# Patient Record
Sex: MALE | Race: WHITE | Employment: OTHER | URBAN - METROPOLITAN AREA
[De-identification: names, ages, dates, MRNs, and addresses within clinical notes are randomized per-mention and may not be internally consistent; named-entity substitution may affect disease eponyms.]

---

## 2018-07-06 ENCOUNTER — OFFICE VISIT (OUTPATIENT)
Dept: ENDOCRINOLOGY | Facility: CLINIC | Age: 28
End: 2018-07-06
Payer: COMMERCIAL

## 2018-07-06 VITALS
TEMPERATURE: 97 F | DIASTOLIC BLOOD PRESSURE: 66 MMHG | HEIGHT: 67 IN | BODY MASS INDEX: 28.46 KG/M2 | WEIGHT: 181.3 LBS | HEART RATE: 57 BPM | SYSTOLIC BLOOD PRESSURE: 117 MMHG

## 2018-07-06 DIAGNOSIS — R63.5 WEIGHT GAIN: Primary | ICD-10-CM

## 2018-07-06 LAB
ANION GAP SERPL CALCULATED.3IONS-SCNC: 6 MMOL/L (ref 3–14)
BUN SERPL-MCNC: 29 MG/DL (ref 7–30)
CALCIUM SERPL-MCNC: 9 MG/DL (ref 8.5–10.1)
CHLORIDE SERPL-SCNC: 106 MMOL/L (ref 94–109)
CO2 SERPL-SCNC: 29 MMOL/L (ref 20–32)
CORTIS SERPL-MCNC: 8.5 UG/DL (ref 4–22)
CREAT SERPL-MCNC: 0.78 MG/DL (ref 0.66–1.25)
FSH SERPL-ACNC: 3 IU/L (ref 0.7–10.8)
GFR SERPL CREATININE-BSD FRML MDRD: >90 ML/MIN/1.7M2
GLUCOSE SERPL-MCNC: 98 MG/DL (ref 70–99)
LH SERPL-ACNC: 3.8 IU/L (ref 1.5–9.3)
POTASSIUM SERPL-SCNC: 4 MMOL/L (ref 3.4–5.3)
PROLACTIN SERPL-MCNC: 8 UG/L (ref 2–18)
SODIUM SERPL-SCNC: 141 MMOL/L (ref 133–144)
T3 SERPL-MCNC: 108 NG/DL (ref 60–181)
T4 FREE SERPL-MCNC: 0.98 NG/DL (ref 0.76–1.46)
TSH SERPL DL<=0.005 MIU/L-ACNC: 2.08 MU/L (ref 0.4–4)

## 2018-07-06 PROCEDURE — 84146 ASSAY OF PROLACTIN: CPT | Mod: 90 | Performed by: INTERNAL MEDICINE

## 2018-07-06 PROCEDURE — 83001 ASSAY OF GONADOTROPIN (FSH): CPT | Performed by: INTERNAL MEDICINE

## 2018-07-06 PROCEDURE — 84305 ASSAY OF SOMATOMEDIN: CPT | Performed by: INTERNAL MEDICINE

## 2018-07-06 PROCEDURE — 84403 ASSAY OF TOTAL TESTOSTERONE: CPT | Performed by: INTERNAL MEDICINE

## 2018-07-06 PROCEDURE — 84443 ASSAY THYROID STIM HORMONE: CPT | Performed by: INTERNAL MEDICINE

## 2018-07-06 PROCEDURE — 84480 ASSAY TRIIODOTHYRONINE (T3): CPT | Performed by: INTERNAL MEDICINE

## 2018-07-06 PROCEDURE — 82024 ASSAY OF ACTH: CPT | Performed by: INTERNAL MEDICINE

## 2018-07-06 PROCEDURE — 84270 ASSAY OF SEX HORMONE GLOBUL: CPT | Performed by: INTERNAL MEDICINE

## 2018-07-06 PROCEDURE — 82533 TOTAL CORTISOL: CPT | Performed by: INTERNAL MEDICINE

## 2018-07-06 PROCEDURE — 99000 SPECIMEN HANDLING OFFICE-LAB: CPT | Performed by: INTERNAL MEDICINE

## 2018-07-06 PROCEDURE — 36415 COLL VENOUS BLD VENIPUNCTURE: CPT | Performed by: INTERNAL MEDICINE

## 2018-07-06 PROCEDURE — 84439 ASSAY OF FREE THYROXINE: CPT | Performed by: INTERNAL MEDICINE

## 2018-07-06 PROCEDURE — 80048 BASIC METABOLIC PNL TOTAL CA: CPT | Performed by: INTERNAL MEDICINE

## 2018-07-06 PROCEDURE — 99204 OFFICE O/P NEW MOD 45 MIN: CPT | Performed by: INTERNAL MEDICINE

## 2018-07-06 PROCEDURE — 83002 ASSAY OF GONADOTROPIN (LH): CPT | Performed by: INTERNAL MEDICINE

## 2018-07-06 RX ORDER — BUPROPION HYDROCHLORIDE 150 MG/1
TABLET ORAL
COMMUNITY
Start: 2018-05-08
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
## 2018-07-06 NOTE — MR AVS SNAPSHOT
After Visit Summary   7/6/2018    Wolfgang Worley    MRN: [id number]           Patient Information     Date Of Birth          1990        Visit Information        Provider Department      7/6/2018 8:30 AM Nery James MD [facility]        Today's Diagnoses     Weight gain    -  1      Care Instructions    Labs today, we will contact you with results.     Please allow 7-10 business days for your lab results. You will be notified by phone, letter, or My Chart after the provider has reviewed them.  Thank you.             Follow-ups after your visit        Follow-up notes from your care team     Return if symptoms worsen or fail to improve.      Who to contact     If you have questions or need follow up information about today's clinic visit or your schedule please contact [facility] directly at [phone].  Normal or non-critical lab and imaging results will be communicated to you by [employer], letter or phone within 4 business days after the clinic has received the results. If you do not hear from us within 7 days, please contact the clinic through [employer] or phone. If you have a critical or abnormal lab result, we will notify you by phone as soon as possible.  Submit refill requests through [employer] or call your pharmacy and they will forward the refill request to us. Please allow 3 business days for your refill to be completed.          Additional Information About Your Visit        [employer] Information     [employer] gives you secure access to your electronic health record. If you see a primary care provider, you can also send messages to your care team and make appointments. If you have questions, please call your primary care clinic.  If you do not have a primary care provider, please call [phone] and they will assist you.      [employer] is an electronic gateway that provides easy, online access to your medical records. With [employer], you can  "request a clinic appointment, read your test results, renew a prescription or communicate with your care team.     To access your existing account, please contact your [facility] or call [phone] for assistance.        Care EveryWhere ID     This is your Care EveryWhere ID. This could be used by other organizations to access your [place] medical records  [id number]        Your Vitals Were     Pulse Temperature Height BMI (Body Mass Index)          57 97  F (36.1  C) 1.702 m (5' 7\") 28.4 kg/m2         Blood Pressure from Last 3 Encounters:   07/06/18 117/66   03/19/09 107/52   09/05/08 108/64    Weight from Last 3 Encounters:   07/06/18 82.2 kg (181 lb 4.8 oz)   03/19/09 74.8 kg (165 lb) (66 %)*   09/05/08 72.1 kg (159 lb) (60 %)*     * Growth percentiles are based on [facility] 2-20 Years data.              We Performed the Following     Adrenal corticotropin     Basic metabolic panel     Cortisol     Follicle stimulating hormone     Insulin Growth Factor 1 by Immunoassay     Lutropin     Macroprolactin     Prolactin     T3 total     T4 free     Testosterone Free and Total     TSH        Primary Care Provider Office Phone # Fax #    Johana DIXON [facility] [phone]       [facility] [address] [facility] [address]        Equal Access to Services     RAÚL LOPEZ : Hadii kb fernandez hadasho Sodayana, waaxda luqadaha, qaybta kaalmada adeegyada, telly haider . So [facility] [phone].    ATENCIÓN: Si habla español, tiene a doe disposición servicios gratuitos de asistencia lingüística. Llelbert al [phone].    We comply with applicable federal civil rights laws and [place] laws. We do not discriminate on the basis of race, color, national origin, age, disability, sex, sexual orientation, or gender identity.            Thank you!     Thank you for choosing [facility]  for your care. Our goal is always to provide you with " excellent care. Hearing back from our patients is one way we can continue to improve our services. Please take a few minutes to complete the written survey that you may receive in the mail after your visit with us. Thank you!             Your Updated Medication List - Protect others around you: Learn how to safely use, store and throw away your medicines at www.disposemymeds.org.          This list is accurate as of 7/6/18 12:08 PM.  Always use your most recent med list.                   Brand Name Dispense Instructions for use Diagnosis    ADDERALL 10 MG per tablet   Generic drug:  amphetamine-dextroamphetamine     30    1 TABLET  DAILY    Attention deficit disorder with hyperactivity(314.01)       buPROPion 150 MG 24 hr tablet    WELLBUTRIN XL     TAKE 1 TABLET(150 MG) BY MOUTH DAILY. DO NOT CRUSH, CHEW, OR SPLIT
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
## 2018-07-06 NOTE — PATIENT INSTRUCTIONS
Labs today, we will contact you with results.     Please allow 7-10 business days for your lab results. You will be notified by phone, letter, or My Chart after the provider has reviewed them.  Thank you.

## 2018-07-07 LAB
MACROPROLACTIN SERPL-MCNC: 6.4 NG/ML (ref 2.1–13.3)
MACROPROLACTIN/PROLACTIN MFR SERPL: 82.1 %
PROLACTIN SERPL IA-MCNC: 7.8 NG/ML (ref 2.1–17.7)

## 2018-07-09 LAB — ACTH PLAS-MCNC: 14 PG/ML

## 2018-07-10 LAB
IGF-I BLD-MCNC: 252 NG/ML (ref 87–255)
SHBG SERPL-SCNC: 58 NMOL/L (ref 11–80)
TESTOST FREE SERPL-MCNC: 7.96 NG/DL (ref 4.7–24.4)
TESTOST SERPL-MCNC: 549 NG/DL (ref 240–950)

## 2018-07-13 NOTE — PROGRESS NOTES
Dear Wolfgang,  Testosterone levels are convincingly normal.  Remaining pituitary labs including assessment for growth hormone using insulin like growth factor I levels, cortisol levels, prolactin levels, thyroid function tests, electrolytes and renal functions are in normal range.    Based on these lab results, I do not think any further workup is required.    With Best Regards,   Nery James MD  Endocrinology Service.

## 2019-09-28 ENCOUNTER — HEALTH MAINTENANCE LETTER (OUTPATIENT)
Age: 29
End: 2019-09-28

## 2021-01-09 ENCOUNTER — HEALTH MAINTENANCE LETTER (OUTPATIENT)
Age: 31
End: 2021-01-09

## 2021-06-02 ENCOUNTER — RECORDS - HEALTHEAST (OUTPATIENT)
Dept: ADMINISTRATIVE | Facility: CLINIC | Age: 31
End: 2021-06-02

## 2021-06-03 ENCOUNTER — RECORDS - HEALTHEAST (OUTPATIENT)
Dept: ADMINISTRATIVE | Facility: CLINIC | Age: 31
End: 2021-06-03

## 2021-10-23 ENCOUNTER — HEALTH MAINTENANCE LETTER (OUTPATIENT)
Age: 31
End: 2021-10-23

## 2022-02-12 ENCOUNTER — HEALTH MAINTENANCE LETTER (OUTPATIENT)
Age: 32
End: 2022-02-12

## 2022-10-09 ENCOUNTER — HEALTH MAINTENANCE LETTER (OUTPATIENT)
Age: 32
End: 2022-10-09

## 2023-02-18 ENCOUNTER — HEALTH MAINTENANCE LETTER (OUTPATIENT)
Age: 33
End: 2023-02-18

## 2023-09-27 ENCOUNTER — TELEPHONE (OUTPATIENT)
Dept: MATERNAL FETAL MEDICINE | Facility: CLINIC | Age: 33
End: 2023-09-27

## 2023-09-27 ENCOUNTER — MEDICAL CORRESPONDENCE (OUTPATIENT)
Dept: HEALTH INFORMATION MANAGEMENT | Facility: CLINIC | Age: 33
End: 2023-09-27
Payer: COMMERCIAL

## 2023-09-27 ENCOUNTER — LAB (OUTPATIENT)
Dept: LAB | Facility: CLINIC | Age: 33
End: 2023-09-27
Payer: COMMERCIAL

## 2023-09-27 ENCOUNTER — DOCUMENTATION ONLY (OUTPATIENT)
Dept: MATERNAL FETAL MEDICINE | Facility: CLINIC | Age: 33
End: 2023-09-27
Payer: COMMERCIAL

## 2023-09-27 DIAGNOSIS — Z31.448 ENCOUNTER FOR OTHER GENETIC TESTING OF MALE FOR PROCREATIVE MANAGEMENT: ICD-10-CM

## 2023-09-27 DIAGNOSIS — Z31.448 ENCOUNTER FOR OTHER GENETIC TESTING OF MALE FOR PROCREATIVE MANAGEMENT: Primary | ICD-10-CM

## 2023-09-27 PROCEDURE — 36415 COLL VENOUS BLD VENIPUNCTURE: CPT

## 2023-09-27 NOTE — PROGRESS NOTES
[facility] [place]  Genetic Counseling Consult    Patient:  Wolfgang Worley YOB: 1990   Date of Service:  9/27/23      Wolfgang was seen at the [facility] [place] for genetic consultation to discuss the option of carrier screening.         Impression/Plan:   1. Wolfgang elected to pursue expanded carrier screening (557 gene panel) as part of his partner, Kaur's preconception management. A sample was drawn today and sent to [facility]. Results are expected within 2-3 weeks, and will be released in [employer]. We will contact Wolfgang to discuss the results. Authorization to share protected health information was signed today by both Wolfgang and Kaur for us to discuss their results with each other.    Expanded carrier screening for mutations in a large panel of genes associated with autosomal recessive conditions including cystic fibrosis, thalassemias, hearing loss, spinal muscular atrophy, and others, is now available. We also reviewed that expanded carrier screening can assess for common X-linked recessive disorders such as Fragile X syndrome.     We discussed that expanded carrier screening is designed to identify carrier status for conditions that are primarily childhood or adolescent onset. Expanded carrier screening does not evaluate for adult-onset conditions such as hereditary cancer syndromes, dementia/ Alzheimer's disease, or cardiovascular disease risk factors. Additionally, expanded carrier screening is not comprehensive for all known genetic diseases or inherited conditions. It will not intentionally screen for autosomal dominant conditions. This is a screening test, and residual carrier status risk figures will be provided to the patient after results become available. Carrier screening is not meant to diagnose the patient with a condition, and generally carriers are asymptomatic. However, certain genes may confer increased risks for  various health concerns in carriers (including, but not limited to: BANDAR, DMD, FMR1).    We discussed that carrier screening can have implications for other family members and that couples are encouraged to share positive results with siblings and other family members of reproductive age. Additionally, even if there is not a high reproductive risk for a condition, it is possible that carrier status can be passed on to future generations.    We reviewed that there is a law in place, the Genetic Information Nondiscrimination Act (MICHELLE), that protects patients from discrimination by health insurance companies and employers based on their genetic information. MICHELLE does not protect against discrimination by life insurance companies or disability insurance.    We reviewed that carrier screening will report on variants classified by the lab as pathogenic or likely pathogenic. Although carrier status does not change over time, it is possible that a variant could be reclassified as more information about the variant is learned. If this occurs, the couple will be contacted and a new risk assessment will be provided.       It was a pleasure to be involved with [facility]. Face-to-face time of the meeting was 90 minutes.    Ruben Andrade MS, [facility]  Genetic Counselor  [facility]  Maternal Fetal Medicine  [email or web address]  [phone]     Patient seen, evaluated and discussed with the Genetic Counseling Intern. I have verified the content of the note, which accurately reflects my assessment of the patient and the plan of care.    Supervising Genetic Counselor  Linda Roach MS, [facility]  Maternal Fetal Medicine  [email or web address]  [phone]

## 2023-10-03 NOTE — TELEPHONE ENCOUNTER
9/28/2023    Called patient to clarify information on family history gathered during our genetic counseling consult on 9/27/2023. Left a voicemail with my callback number. If I do not hear back from Wolfgang, I will address this information when I call out his carrier screening results.    Ruben Andrade MS, [facility] [place] [place]  Certified Genetic Counselor  [facility]  Maternal Fetal Medicine  Office: [phone]  [facility]: [phone]   Fax: [phone]  [facility]

## 2023-10-09 LAB — SCANNED LAB RESULT: ABNORMAL

## 2023-10-11 ENCOUNTER — TELEPHONE (OUTPATIENT)
Dept: MATERNAL FETAL MEDICINE | Facility: CLINIC | Age: 33
End: 2023-10-11
Payer: COMMERCIAL

## 2023-10-11 NOTE — TELEPHONE ENCOUNTER
October 11, 2023     Carrier Screening Results Disclosure  [facility] Maternal Fetal Medicine     I spoke with Wolfgang's partner, Kaur, today to review her and Wolfgang's carrier screening results. Kaur informed me that they are currently in the process of moving, and so Wolfgang may not be available to take a call. Kaur will discuss results with Wolfgang. Should Wolfgang have questions, he can contact me for further discussion.     Kaur had a 558 gene panel through [employer]. Wolfgang, also had carrier screening performed (557 gene panel through [employer]). His screening included the same genes as Kaur's screening other than FMR1, which is associated with the X-chromosome linked condition Fragile X Syndrome. Wolfgang previously signed an authorization to share protected information form to discuss his results with Kaur.     Kaur screened negative for all 558 conditions. Wolfgang screened negative for 556 conditions and was found to harbor a pathogenic variant in 1 gene. Positive results and their respective reproductive risks are outlined below.     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 Therefore, their reproductive risk to have a child together with these conditions is greatly reduced. We discussed that a negative result does not completely eliminate the chance to be a carrier. The risk to be a carrier following negative screening is called residual risk.      For each individual pregnancy together, the risk for Wolfgang and Kaur to have a child with the following conditions are:  Biotinidase deficiency  (1/1 chance Wolfgang is a carrier) x (1/12,400 Kaur's residual risk) x (1/4 chance both carriers pass on pathogenic copies of the gene) = 1/49,600 (0.002%) risk     Condition Descriptions   Biotinidase deficiency  Biotinidase deficiency (BTD) is a  condition in which the body has difficulty recycling a B vitamin called biotin. Symptoms of BTD are variable and typically involve neurologic and skin findings. If untreated, profound BTD typically presents during the first few months of life, and the symptoms may be severe. There is also a milder form of BTD, called partial biotinidase deficiency. Individuals with partial BTD typically do not have any signs or symptoms of the condition (asymptomatic). However, if untreated, symptoms of partial BTD may appear during times of illness or stress and may include low muscle tone (hypotonia), skin rashes, and hair loss (alopecia). BTD is readily treatable, and early treatment, including biotin supplementation, may prevent or reduce the severity of symptoms.  Individuals with partial BTD have one copy of the c.1330G>C (p.[id number]) variant and a second disease-causing variant in the BTD gene on the opposite chromosome. Some individuals have 2 copies of the c.1330G>C (p.Knn324Fgb) variant (homozygous). These individuals have mild enzyme deficiency, but do not have clinical symptoms of partial BTD.  Follow-up depends on each affected individual s specific situation, and discussion with a healthcare provider should be considered.     Kaur discussed how Wolfgang has sensitive skin and inquired whether this could be related to him being a carrier of Biotinidase deficiency. While it could be possible that this characteristic could be related to his carrier status, I informed Kaur that I was unaware of any specific research providing evidence of skin sensitivity as a feature seen in biotinidase deficiency carriers. We reviewed how different characteristics can be multifactorial, having many different contributing components. Nitas skin sensitivity could be due to many other factors unrelated to being a carrier of this condition. Skin and hair-related symptoms in those affected with biotinidase deficiency can include rashes and  alopecia. In general, carriers of biotinidase deficiency are not expected to have symptoms.     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  Benign change, c.2065G>A (p.[id number]), known to be a pseudodeficiency allele, identified in the GAA gene.  Pseudodeficiency alleles are not known to be associated with disease, including glycogen storage disease type II (Pompe disease).  Benign change,  c.742G>A (p.Mar296Orp), known to be a pseudodeficiency allele, identified in the GALC gene.  Pseudodeficiency alleles are not known to be associated with disease, including Krabbe disease.    Kaur was also found to have a pseudodeficiency allele.  Benign changes, c.1685T>C (p.[id number]), known to be a pseudodeficiency alleles, identified in the GALC gene.  Pseudodeficiency alleles are not known to be associated with disease, including Krabbe disease.     We discussed that the presence of a pseudodeficiency allele does not impact the risk to be a carrier. Carrier testing for reproductive partners is not indicated based on these results. People with pseudodeficiency alleles may have false positive results on biochemical tests such as  screening. Pseudodeficiency alleles are not known to cause disease, even when homozygous or in combination with another disease-causing variant (compound heterozygous).  Kaur was encouraged to share these results with a provider should she have a child screen positive on  screening.     Familial Screening   Wolfgang screened positive as a carrier of 1 autosomal recessive condition. He is not expected to have symptoms. He has a 50% chance to pass on the pathogenic variant to each future child. This means each child would have a 50% chance to also be a carrier. His other first degree relatives also have a 50% risk to carry each of these conditions. We discussed sharing this information with family members who could be at risk to be carriers.        It is a  pleasure to be involved in Wolfgang's care.        Ruben Andrade MS, [facility]  Certified Genetic Counselor  [facility]  Maternal Fetal Medicine  Office: [phone]  [facility]: [phone]   Fax: [phone]  [facility]

## 2024-03-16 ENCOUNTER — HEALTH MAINTENANCE LETTER (OUTPATIENT)
Age: 34
End: 2024-03-16